# Patient Record
Sex: MALE | Race: WHITE | NOT HISPANIC OR LATINO | Employment: FULL TIME | URBAN - METROPOLITAN AREA
[De-identification: names, ages, dates, MRNs, and addresses within clinical notes are randomized per-mention and may not be internally consistent; named-entity substitution may affect disease eponyms.]

---

## 2021-12-16 ENCOUNTER — NURSE TRIAGE (OUTPATIENT)
Dept: OTHER | Facility: OTHER | Age: 26
End: 2021-12-16

## 2021-12-16 ENCOUNTER — HOSPITAL ENCOUNTER (EMERGENCY)
Facility: HOSPITAL | Age: 26
Discharge: HOME | End: 2021-12-16
Attending: EMERGENCY MEDICINE | Admitting: EMERGENCY MEDICINE
Payer: COMMERCIAL

## 2021-12-16 VITALS
WEIGHT: 250 LBS | RESPIRATION RATE: 20 BRPM | TEMPERATURE: 99 F | SYSTOLIC BLOOD PRESSURE: 130 MMHG | DIASTOLIC BLOOD PRESSURE: 60 MMHG | HEIGHT: 71 IN | BODY MASS INDEX: 35 KG/M2 | OXYGEN SATURATION: 95 % | HEART RATE: 109 BPM

## 2021-12-16 DIAGNOSIS — U07.1 COVID-19 VIRUS INFECTION: Primary | ICD-10-CM

## 2021-12-16 LAB
FLUAV RNA RESP QL NAA+PROBE: NEGATIVE
FLUBV RNA RESP QL NAA+PROBE: NEGATIVE
RSV RNA RESP QL NAA+PROBE: NEGATIVE
SARS-COV-2 RNA RESP QL NAA+PROBE: POSITIVE

## 2021-12-16 PROCEDURE — 99283 EMERGENCY DEPT VISIT LOW MDM: CPT

## 2021-12-16 PROCEDURE — 99284 EMERGENCY DEPT VISIT MOD MDM: CPT | Performed by: EMERGENCY MEDICINE

## 2021-12-16 PROCEDURE — 0241U HB NFCT DS VIR RESP RNA 4 TRGT: CPT | Performed by: EMERGENCY MEDICINE

## 2021-12-17 NOTE — RESULT ENCOUNTER NOTE
PATIENT NOTIFIED OF POSITIVE COVID RESULTS THE NEED QUARANTINE FOR 10 DAYS FROM THE ONSET OF HIS SYMPTOMS    HE WILL FOLLOW UP AS NEEDED

## 2021-12-17 NOTE — ED PROVIDER NOTES
History  Chief Complaint   Patient presents with    Fever - 9 weeks to 74 years     pt states he took at home covid test and tested positive, fever of 104 5 temporal at home, took 4 tylneol at home, c/o slight cough       History provided by:  Patient   used: No    27year-old otherwise healthy male presented for evaluation of high fever today, also noting slight cough  He took for Tylenol tablets prior to arrival   Reports he took a home COVID test and was positive  He is mostly concerned with the high fever  On arrival here temperature has improved  Slightly tachycardic  He denies any difficulty breathing, chest pain or any other significant symptoms  Prior to Admission Medications   Prescriptions Last Dose Informant Patient Reported? Taking?   lisdexamfetamine (VYVANSE) 70 MG capsule Past Month at Unknown time  Yes Yes   Sig: Take 70 mg by mouth every morning      Facility-Administered Medications: None       History reviewed  No pertinent past medical history  History reviewed  No pertinent surgical history  History reviewed  No pertinent family history  I have reviewed and agree with the history as documented  E-Cigarette/Vaping     E-Cigarette/Vaping Substances     Social History     Tobacco Use    Smoking status: Not on file    Smokeless tobacco: Not on file   Substance Use Topics    Alcohol use: Not on file    Drug use: Not on file       Review of Systems   Constitutional: Positive for fever  Negative for activity change, appetite change and chills  Respiratory: Positive for cough  Negative for chest tightness and shortness of breath  Cardiovascular: Negative for chest pain  Gastrointestinal: Negative for abdominal pain, diarrhea, nausea and vomiting  Musculoskeletal: Negative for back pain, neck pain and neck stiffness  Skin: Negative for color change and pallor  Neurological: Negative for dizziness and headaches     All other systems reviewed and are negative  Physical Exam  Physical Exam  Vitals and nursing note reviewed  Constitutional:       Appearance: Normal appearance  HENT:      Head: Normocephalic and atraumatic  Cardiovascular:      Rate and Rhythm: Regular rhythm  Tachycardia present  Pulmonary:      Effort: Pulmonary effort is normal       Breath sounds: Normal breath sounds  Abdominal:      General: There is no distension  Palpations: Abdomen is soft  Musculoskeletal:         General: No swelling  Normal range of motion  Cervical back: Normal range of motion and neck supple  Skin:     General: Skin is warm and dry  Neurological:      General: No focal deficit present  Mental Status: He is alert and oriented to person, place, and time     Psychiatric:         Mood and Affect: Mood normal          Behavior: Behavior normal          Vital Signs  ED Triage Vitals   Temperature Pulse Respirations Blood Pressure SpO2   12/16/21 2247 12/16/21 2248 12/16/21 2247 12/16/21 2247 12/16/21 2247   99 °F (37 2 °C) (!) 109 20 130/60 95 %      Temp Source Heart Rate Source Patient Position - Orthostatic VS BP Location FiO2 (%)   12/16/21 2247 12/16/21 2247 12/16/21 2247 12/16/21 2247 --   Oral Monitor Sitting Left arm       Pain Score       --                  Vitals:    12/16/21 2247 12/16/21 2248   BP: 130/60    Pulse:  (!) 109   Patient Position - Orthostatic VS: Sitting          Visual Acuity      ED Medications  Medications - No data to display    Diagnostic Studies  Results Reviewed     Procedure Component Value Units Date/Time    COVID/FLU/RSV [[id number]]  (Abnormal) Collected: 12/16/21 2252    Lab Status: Final result Specimen: Nares from Nasopharyngeal Swab Updated: 12/16/21 2351     SARS-CoV-2 Positive     INFLUENZA A PCR Negative     INFLUENZA B PCR Negative     RSV PCR Negative    Narrative:      FOR PEDIATRIC PATIENTS - copy/paste COVID Guidelines URL to browser: [email or web address]     This test has been authorized by FDA under an EUA (Emergency Use Assay) for use by authorized laboratories  Clinical caution and judgement should be used with the interpretation of these results with consideration of the clinical impression and other laboratory testing  Testing reported as "Positive" or "Negative" has been proven to be accurate according to standard laboratory validation requirements  All testing is performed with control materials showing appropriate reactivity at standard intervals  No orders to display              Procedures  Procedures         ED Course                                             MDM  Number of Diagnoses or Management Options  COVID-19 virus infection: new and requires workup  Diagnosis management comments: 28-year-old male testing positive at home for COVID after developing high fever today  Fever improved after taking Tylenol  He was counseled on correct dosage of Tylenol and dangers of taking too much  Stable for discharge  Return if worse  Continue supportive care  Amount and/or Complexity of Data Reviewed  Clinical lab tests: ordered    Patient Progress  Patient progress: stable      Disposition  Final diagnoses:   COVID-19 virus infection     Time reflects when diagnosis was documented in both MDM as applicable and the Disposition within this note     Time User Action Codes Description Comment    12/16/2021 11:34 PM Shelli GAMING Add [U07 1] COVID-19 virus infection       ED Disposition     ED Disposition Condition Date/Time Comment    Discharge Stable Thu Dec 16, 2021 11:34 PM Rodrick Child discharge to home/self care              Follow-up Information     Follow up With Specialties Details Why Contact Info Additional [address] Emergency Department Emergency Medicine  If symptoms worsen Janell [address] [facility]  [phone] [facility] [address] Department, [address], [place], [place], [address]          Discharge Medication List as of 12/16/2021 11:34 PM      CONTINUE these medications which have NOT CHANGED    Details   lisdexamfetamine (VYVANSE) 70 MG capsule Take 70 mg by mouth every morning, Historical Med             No discharge procedures on file      PDMP Review     None          ED Provider  Electronically Signed by           Roopa Velázquez MD  01/07/22 2454

## 2022-10-16 PROBLEM — F90.9 ADHD (ATTENTION DEFICIT HYPERACTIVITY DISORDER): Status: ACTIVE | Noted: 2022-10-16

## 2022-10-17 ENCOUNTER — OFFICE VISIT (OUTPATIENT)
Dept: FAMILY MEDICINE CLINIC | Facility: CLINIC | Age: 27
End: 2022-10-17
Payer: COMMERCIAL

## 2022-10-17 VITALS
OXYGEN SATURATION: 97 % | RESPIRATION RATE: 18 BRPM | HEART RATE: 98 BPM | TEMPERATURE: 98.2 F | DIASTOLIC BLOOD PRESSURE: 86 MMHG | HEIGHT: 71 IN | SYSTOLIC BLOOD PRESSURE: 132 MMHG | BODY MASS INDEX: 38.64 KG/M2 | WEIGHT: 276 LBS

## 2022-10-17 DIAGNOSIS — Z13.1 SCREENING FOR DIABETES MELLITUS: ICD-10-CM

## 2022-10-17 DIAGNOSIS — F90.9 ATTENTION DEFICIT HYPERACTIVITY DISORDER (ADHD), UNSPECIFIED ADHD TYPE: ICD-10-CM

## 2022-10-17 DIAGNOSIS — Z00.00 ANNUAL PHYSICAL EXAM: Primary | ICD-10-CM

## 2022-10-17 DIAGNOSIS — Z13.6 SCREENING FOR CARDIOVASCULAR CONDITION: ICD-10-CM

## 2022-10-17 DIAGNOSIS — E66.9 CLASS 2 OBESITY WITH BODY MASS INDEX (BMI) OF 38.0 TO 38.9 IN ADULT, UNSPECIFIED OBESITY TYPE, UNSPECIFIED WHETHER SERIOUS COMORBIDITY PRESENT: ICD-10-CM

## 2022-10-17 DIAGNOSIS — L98.9 SCALP LESION: ICD-10-CM

## 2022-10-17 DIAGNOSIS — E66.01 SEVERE OBESITY (BMI 35.0-39.9) WITH COMORBIDITY (HCC): ICD-10-CM

## 2022-10-17 DIAGNOSIS — Z23 ENCOUNTER FOR IMMUNIZATION: ICD-10-CM

## 2022-10-17 DIAGNOSIS — Z11.59 NEED FOR HEPATITIS C SCREENING TEST: ICD-10-CM

## 2022-10-17 DIAGNOSIS — F81.81 WRITTEN EXPRESSION DISORDER: ICD-10-CM

## 2022-10-17 DIAGNOSIS — E66.9 OBESITY (BMI 35.0-39.9 WITHOUT COMORBIDITY): ICD-10-CM

## 2022-10-17 DIAGNOSIS — Z11.4 SCREENING FOR HIV (HUMAN IMMUNODEFICIENCY VIRUS): ICD-10-CM

## 2022-10-17 DIAGNOSIS — R27.8 DYSGRAPHIA: ICD-10-CM

## 2022-10-17 DIAGNOSIS — G43.109 MIGRAINE WITH AURA AND WITHOUT STATUS MIGRAINOSUS, NOT INTRACTABLE: ICD-10-CM

## 2022-10-17 PROCEDURE — 90651 9VHPV VACCINE 2/3 DOSE IM: CPT

## 2022-10-17 PROCEDURE — 99203 OFFICE O/P NEW LOW 30 MIN: CPT | Performed by: FAMILY MEDICINE

## 2022-10-17 PROCEDURE — 99385 PREV VISIT NEW AGE 18-39: CPT | Performed by: FAMILY MEDICINE

## 2022-10-17 PROCEDURE — 90471 IMMUNIZATION ADMIN: CPT

## 2022-10-17 NOTE — PATIENT INSTRUCTIONS
Please contact your insurance if you are uncertain of coverage for plan of care items  Your insurance may not cover the cost of your Vitamin D blood test, which is approximately $65-70  Please notify the lab prior to blood draw if you would like to decline this test       Refer to the back of insurance card for counseling options  Apps and Websites for Counseling, Anxiety/Depression, Chronic Pain, Lifestyle Change, Problem-solving, Self-Esteem, Anger Management, Coping with Uncertainty    (Prices current as of 9/5/19)    1  Mood Kit - Available in [employer]b Store for $4 99  Supports a person's success in specific situations, includes thought , mood tracker, and journal   Can be accessed in an unstructured way and used as an unguided self-help robb  2   Moodnotes - Available in [employer] Store for $4 99  Focuses on healthier thinking habits and identifying "traps" in thinking  Tracks mood over a period of time and identifies factors that influence mood  3   MoodMission - Available in [facility] for free  Includes five "missions" to promote confidence in handling stressors and coping in specific situations  The robb personalizes its style and techniques based on when the user engages it most frequently  In-robb rewards are used to motivate, increase fun and self-confidence  Helpful for patients who need improvement in mood or a decrease in anxiety and depression symptoms  4    What's Up - Available in [employer] for free  Recognizes negative thinking patterns and methods to overcome them  Uses helpful metaphors, a catastrophe scale, grounding techniques, and breathing exercises  Has the ability to sync data across multiple devices and protect this information with a unique pass code  Has the capability to be active in forums where people can discuss similar feelings and strategies that have been helpful        5   Moodpath - Available in [employer] Store and Google Play for free  Uses daily screenings to create a better understanding of thoughts, feelings, and emotions  When needed, it provides a discussion guide to talking with a medical professional based on the responses to daily screenings  Includes over 150 psychological exercises and videos to promote and strengthen overall mental health  6   MindShift - Available in [employer] for free  Helpful for youth and young adults in coping with anxiety  Creates an individualized toolbox to help users deal with test anxiety, perfectionism, social anxiety, worry, panic, and conflict  Includes directions on how to construct “belief experiments” to trial common beliefs that feed anxiety, guided relaxation, as well as tools and tips to help establish and accomplish goals  Differentiates between helpful and unhelpful anxiety, and explains how to overcome fears by gradually facing them in manageable steps  7   CBT-I  - Available in [employer] for free  For insomnia  Educates about sleep, developing positive sleep routines, and improved sleep environment  Encourages user to change behaviors which will provide confidence for better sleep on a regular basis  8   [place]  [facility] - Free website  Provides self-help and therapy resources that encourages change to combat negative and destructive thought patterns  Includes access to numerous handouts on a variety of symptoms related to anxiety, depression, low self-esteem, panic attacks, social disorders, and more  The solution section has interventions that can be printed and saved for future use  9   Woebot - Available in [employer] for free  Recommended for age 16+    Friendly self-care  that helps you think through situations with step-by-step guidance using counseling tools, learn about yourself with intelligent mood tracking, and master skills to reduce stress and live happier through 100+ evidence-based stories from clinicians  Chat as often or as little as you'd like, whenever you'd like to  10   Luz:  MILAN  CBT DBT Chatbot - Available in Apple appsstore and Google Play for free, has in-karina purchases  Recommended for 12+  Psychologist support at $30/month, 50% off to start  Margokul Lopez / Lennox Inocencio is free  Uses techniques of CBT, DBT, yoga, and meditation to support your needs regarding stress, anxiety, sleep, loss, and a full range of other mental health and wellness issues  11   Curable Pain Relief - Available in Apple Karina store and Google Play for free, has in-karina purchases  Teaches about the chronic pain cycle and how to reverse it, while retraining your brain and nervous system for long-term results  Smart  Linda guides user through updates in pain science to identify, target, eliminate the factors that are keeping user "stuck" in the pain cycle  12   Talkspace Online Therapy - Available in Apple Karina store and Google Play for a fee  Subscription service, starting at $59/week (billed monthly)  All plans include unlimited messaging, and add live video sessions if desired  Unlimited messaging therapy for teens ages 15-14 and special services for couples therapy  You can change therapists or stop subscription renewal at any time  Recommended for 13+  User is matched with a licensed therapist in your state and communicate on your device by text, audio, and video from anywhere, at any time  User will hear back at least once a day, 5 days per week        Counseling services:    • [facility]   82 [facility], [address], [address] [facility], [facility], [address] [facility]  [phone]    • Carmella and [facility] (they have equine therapy too)  8 [facility], [place] [id number],  [facility], [address] [facility]  [address],  [address] [facility], [address]  [phone]     • [address]   70 [facility], [address] [place] [address] [place]   [address]  200 [facility], [address]  [place], [address]    1530 [facility]   [address] [facility], [facility], [address]     [phone]     • REHABILITATION [facility]   [address] [facility], [facility], [address]    • [address]  41 [facility], [address]  [address]   [address] [place] [address]  [email or web address]    • Rj Rico, MSW, LSW  (patients age 11-adult)   [address], 243 [facility], [address]     • Lico Song  [address]  [address] [place] [address], [address]   [facility], [facility] [facility] [address]  [phone]     • [facility], [facility], [place], [place] [facility]  [phone]     • [address] [place] [address] (specializing in addiction)  [facility], [facility] [address]  [phone]    • Dave Lewis  [address] [place] [address], [facility], [facility]      • Maryla Bence, [address] [facility] , [address], [place], [address]  6 , [place], [facility], [address] [facility]  100 [facility], [address], [place], [address] [place]  [phone]     • Foreign Ohs, PsyD  1160 [facility], [place], [facility] [address]   [phone]    • Darien Alicea, [address]  [place] [address] [place] [id number] [phone]      Hotlines:   Please program these numbers into your phone in case you or someone you know needs them  All services are free  65  People who call, text, or chat with 8 will be directly connected to the [address] [place] [address]  The existing Lifeline phone number ([phone]) will remain available  Callers can also connect with the [facility] or assistance in [place]   989 can be used by anyone, any time, at no cost  Trained crisis response professionals can support individuals considering suicide, self-harm, or any behavioral, substance abuse or misuse or mental health need for themselves or people looking for help for a loved one experiencing a mental health crisis  Lifeline services are available 24 hours a day, seven days a week at no cost to the caller  Crisis Text Line: text 084771     You are connected to a Crisis Counselor to bring a hot moment to a cool calm through active listening and collaborative problem solving  WarmLine:  625.279.2302 or [phone]   They provide a supportive listening ear if you need it  They also can also provide information about mental health concerns and services  Crisis Line:  [facility] [phone],  [facility] [phone], [address] [place] [address] and Angie: [phone]   24/7 crisis counseling, on-site counseling and walk-in counseling services available  National Suicide Prevention Lifeline:  3-699-951-626-562-8742  En [address] [facility] 8-393.524.2684   This is free, confidential, and available 24/7  [place]: [phone]   For those facing or having survived abuse 24 hour confidential help line, emergency safe house, counseling, advocacy and education  If you or someone your know are feeling as though you are going to hurt yourself, do not wait - GET HELP RIGHT AWAY  Go to the closest Emergency Room, call 911, or call someone you trust, family member or friend to be with you until you can get some help  For headache and migraine prevention I would suggest a combination vitamin supplement which may include 1 or more of the following ingredients:  Magnesium 400 mg , Riboflavin (vitamin B2) 400 - 600 mg,  Butterbur 150 mg (PA free)  Other ingredients that may be helpful are feverfew, coenzyme Q10 100 mg three times a day,  melatonin and enrico  Some example brands that combine some of these ingredients are Migravent or Dolovent         Wellness Visit for Adults   AMBULATORY CARE: A wellness visit  is when you see your healthcare provider to get screened for health problems  Your healthcare provider will also give you advice on how to stay healthy  Write down your questions so you remember to ask them  Ask your healthcare provider how often you should have a wellness visit  What happens at a wellness visit:  Your healthcare provider will ask about your health, and your family history of health problems  This includes high blood pressure, heart disease, and cancer  He or she will ask if you have symptoms that concern you, if you smoke, and about your mood  You may also be asked about your intake of medicines, supplements, food, and alcohol  Any of the following may be done:  · Your weight  will be checked  Your height may also be checked so your body mass index (BMI) can be calculated  Your BMI shows if you are at a healthy weight  · Your blood pressure  and heart rate will be checked  Your temperature may also be checked  · Blood and urine tests  may be done  Blood tests may be done to check your cholesterol levels  Abnormal cholesterol levels increase your risk for heart disease and stroke  You may also need a blood or urine test to check for diabetes if you are at increased risk  Urine tests may be done to look for signs of an infection or kidney disease  · A physical exam  includes checking your heartbeat and lungs with a stethoscope  Your healthcare provider may also check your skin to look for sun damage  · Screening tests  may be recommended  A screening test is done to check for diseases that may not cause symptoms  The screening tests you may need depend on your age, gender, family history, and lifestyle habits  For example, colorectal screening may be recommended if you are 48years old or older  Screening tests you need if you are a woman:   · A Pap smear  is used to screen for cervical cancer  Pap smears are usually done every 3 to 5 years depending on your age  You may need them more often if you have had abnormal Pap smear test results in the past  Ask your healthcare provider how often you should have a Pap smear  · A mammogram  is an x-ray of your breasts to screen for breast cancer  Experts recommend mammograms every 2 years starting at age 48 years  You may need a mammogram at age 52 years or younger if you have an increased risk for breast cancer  Talk to your healthcare provider about when you should start having mammograms and how often you need them  Vaccines you may need:   · Get an influenza vaccine  every year  The influenza vaccine protects you from the flu  Several types of viruses cause the flu  The viruses change over time, so new vaccines are made each year  · Get a tetanus-diphtheria (Td) booster vaccine  every 10 years  This vaccine protects you against tetanus and diphtheria  Tetanus is a severe infection that may cause painful muscle spasms and lockjaw  Diphtheria is a severe bacterial infection that causes a thick covering in the back of your mouth and throat  · Get a human papillomavirus (HPV) vaccine  if you are female and aged 23 to 32 or male 23 to 24 and never received it  This vaccine protects you from HPV infection  HPV is the most common infection spread by sexual contact  HPV may also cause vaginal, penile, and anal cancers  · Get a pneumococcal vaccine  if you are aged 72 years or older  The pneumococcal vaccine is an injection given to protect you from pneumococcal disease  Pneumococcal disease is an infection caused by pneumococcal bacteria  The infection may cause pneumonia, meningitis, or an ear infection  · Get a shingles vaccine  if you are 60 or older, even if you have had shingles before  The shingles vaccine is an injection to protect you from the varicella-zoster virus  This is the same virus that causes chickenpox   Shingles is a painful rash that develops in people who had chickenpox or have been exposed to the virus     How to eat healthy:  My Plate is a model for planning healthy meals  It shows the types and amounts of foods that should go on your plate  Fruits and vegetables make up about half of your plate, and grains and protein make up the other half  A serving of dairy is included on the side of your plate  The amount of calories and serving sizes you need depends on your age, gender, weight, and height  Examples of healthy foods are listed below:  · Eat a variety of vegetables  such as dark green, red, and orange vegetables  You can also include canned vegetables low in sodium (salt) and frozen vegetables without added butter or sauces  · Eat a variety of fresh fruits , canned fruit in 100% juice, frozen fruit, and dried fruit  · Include whole grains  At least half of the grains you eat should be whole grains  Examples include whole-wheat bread, wheat pasta, brown rice, and whole-grain cereals such as oatmeal     · Eat a variety of protein foods such as seafood (fish and shellfish), lean meat, and poultry without skin (turkey and chicken)  Examples of lean meats include pork leg, shoulder, or tenderloin, and beef round, sirloin, tenderloin, and extra lean ground beef  Other protein foods include eggs and egg substitutes, beans, peas, soy products, nuts, and seeds  · Choose low-fat dairy products such as skim or 1% milk or low-fat yogurt, cheese, and cottage cheese  · Limit unhealthy fats  such as butter, hard margarine, and shortening  Exercise:  Exercise at least 30 minutes per day on most days of the week  Some examples of exercise include walking, biking, dancing, and swimming  You can also fit in more physical activity by taking the stairs instead of the elevator or parking farther away from stores  Include muscle strengthening activities 2 days each week  Regular exercise provides many health benefits   It helps you manage your weight, and decreases your risk for type 2 diabetes, heart disease, stroke, and high blood pressure  Exercise can also help improve your mood  Ask your healthcare provider about the best exercise plan for you  General health and safety guidelines:   · Do not smoke  Nicotine and other chemicals in cigarettes and cigars can cause lung damage  Ask your healthcare provider for information if you currently smoke and need help to quit  E-cigarettes or smokeless tobacco still contain nicotine  Talk to your healthcare provider before you use these products  · Limit alcohol  A drink of alcohol is 12 ounces of beer, 5 ounces of wine, or 1½ ounces of liquor  · Lose weight, if needed  Being overweight increases your risk of certain health conditions  These include heart disease, high blood pressure, type 2 diabetes, and certain types of cancer  · Protect your skin  Do not sunbathe or use tanning beds  Use sunscreen with a SPF 15 or higher  Apply sunscreen at least 15 minutes before you go outside  Reapply sunscreen every 2 hours  Wear protective clothing, hats, and sunglasses when you are outside  · Drive safely  Always wear your seatbelt  Make sure everyone in your car wears a seatbelt  A seatbelt can save your life if you are in an accident  Do not use your cell phone when you are driving  This could distract you and cause an accident  Pull over if you need to make a call or send a text message  · Practice safe sex  Use latex condoms if are sexually active and have more than one partner  Your healthcare provider may recommend screening tests for sexually transmitted infections (STIs)  · Wear helmets, lifejackets, and protective gear  Always wear a helmet when you ride a bike or motorcycle, go skiing, or play sports that could cause a head injury  Wear protective equipment when you play sports  Wear a lifejacket when you are on a boat or doing water sports      © Copyright [employer] 2022 Information is for End User's use only and may not be sold, redistributed or otherwise used for commercial purposes  All illustrations and images included in CareNotes® are the copyrighted property of A D A M , Inc  or Krissy Meredith  The above information is an  only  It is not intended as medical advice for individual conditions or treatments  Talk to your doctor, nurse or pharmacist before following any medical regimen to see if it is safe and effective for you  Obesity   AMBULATORY CARE:   Obesity  means your body mass index (BMI) is greater than 30  Your healthcare provider will use your height and weight to measure your BMI  The risks of obesity include  many health problems, including injuries or physical disability  · Diabetes (high blood sugar level)    · High blood pressure or high cholesterol    · Heart disease    · Stroke    · Gallbladder or liver disease    · Cancer of the colon, breast, prostate, liver, or kidney    · Sleep apnea    · Arthritis or gout    Screening  is done to check for health conditions before you have signs or symptoms  If you are 28to 79years old, your blood sugar level may be checked every 3 years for signs of prediabetes or diabetes  Your healthcare provider will check your blood pressure at each visit  High blood pressure can lead to a stroke or other problems  Your provider may check for signs of heart disease, cancer, or other health problems  Seek care immediately if:   · You have a severe headache, confusion, or difficulty speaking  · You have weakness on one side of your body  · You have chest pain, sweating, or shortness of breath  Call your doctor if:   · You have symptoms of gallbladder or liver disease, such as pain in your upper abdomen  · You have knee or hip pain and discomfort while walking  · You have symptoms of diabetes, such as intense hunger and thirst, and frequent urination  · You have symptoms of sleep apnea, such as snoring or daytime sleepiness      · You have questions or concerns about your condition or care  Treatment for obesity  focuses on helping you lose weight to improve your health  Even a small decrease in BMI can reduce the risk for many health problems  Your healthcare provider will help you set a weight-loss goal   · Lifestyle changes  are the first step in treating obesity  These include making healthy food choices and getting regular physical activity  Your healthcare provider may suggest a weight-loss program that involves coaching, education, and therapy  · Medicine  may help you lose weight when it is used with a healthy foods and physical activity  · Surgery  can help you lose weight if you are very obese and have other health problems  There are several types of weight-loss surgery  Ask your healthcare provider for more information  Tips for safe weight loss:   · Set small, realistic goals  An example of a small goal is to walk for 20 minutes 5 days a week  Anther goal is to lose 5% of your body weight  · Tell friends, family members, and coworkers about your goals  and ask for their support  Ask a friend to lose weight with you, or join a weight-loss support group  · Identify foods or triggers that may cause you to overeat , and find ways to avoid them  Remove tempting high-calorie foods from your home and workplace  Place a bowl of fresh fruit on your kitchen counter  If stress causes you to eat, then find other ways to cope with stress  A counselor or therapist may be able to help you  · Keep a diary to track what you eat and drink  Also write down how many minutes of physical activity you do each day  Weigh yourself once a week and record it in your diary  Eating changes: You will need to eat 500 to 1,000 fewer calories each day than you currently eat to lose 1 to 2 pounds a week  The following changes will help you cut calories:  · Eat smaller portions  Use small plates, no larger than 9 inches in diameter   Fill your plate half full of fruits and vegetables  Measure your food using measuring cups until you know what a serving size looks like  · Eat 3 meals and 1 or 2 snacks each day  Plan your meals in advance  Hdzhemalatha Connors and eat at home most of the time  Eat slowly  Do not skip meals  Skipping meals can lead to overeating later in the day  This can make it harder for you to lose weight  Talk with a dietitian to help you make a meal plan and schedule that is right for you  · Eat fruits and vegetables at every meal   They are low in calories and high in fiber, which makes you feel full  Do not add butter, margarine, or cream sauce to vegetables  Use herbs to season steamed vegetables  · Eat less fat and fewer fried foods  Eat more baked or grilled chicken and fish  These protein sources are lower in calories and fat than red meat  Limit fast food  Dress your salads with olive oil and vinegar instead of bottled dressing  · Limit the amount of sugar you eat  Do not drink sugary beverages  Limit alcohol  Activity changes:  Physical activity is good for your body in many ways  It helps you burn calories and build strong muscles  It decreases stress and depression, and improves your mood  It can also help you sleep better  Talk to your healthcare provider before you begin an exercise program   · Exercise for at least 30 minutes 5 days a week  Start slowly  Set aside time each day for physical activity that you enjoy and that is convenient for you  It is best to do both weight training and an activity that increases your heart rate, such as walking, bicycling, or swimming  · Find ways to be more active  Do yard work and housecleaning  Walk up the stairs instead of using elevators  Spend your leisure time going to events that require walking, such as outdoor festivals or fairs  This extra physical activity can help you lose weight and keep it off  Follow up with your doctor as directed:   You may need to meet with a dietitian  Write down your questions so you remember to ask them during your visits  © Copyright [employer] 2022 Information is for End User's use only and may not be sold, redistributed or otherwise used for commercial purposes  All illustrations and images included in CareNotes® are the copyrighted property of A [employer] A [employer]  or Krissy Meredith  The above information is an  only  It is not intended as medical advice for individual conditions or treatments  Talk to your doctor, nurse or pharmacist before following any medical regimen to see if it is safe and effective for you  Cholesterol and Your Health   AMBULATORY CARE:   Cholesterol  is a waxy, fat-like substance  Your body uses cholesterol to make hormones and new cells, and to protect nerves  Cholesterol is made by your body  It also comes from certain foods you eat, such as meat and dairy products  Your healthcare provider can help you set goals for your cholesterol levels  He or she can help you create a plan to meet your goals  Cholesterol level goals: Your cholesterol level goals depend on your risk for heart disease, your age, and your other health conditions  The following are general guidelines:  · Total cholesterol  includes low-density lipoprotein (LDL), high-density lipoprotein (HDL), and triglyceride levels  The total cholesterol level should be lower than 200 mg/dL and is best at about 150 mg/dL  · LDL cholesterol  is called bad cholesterol  because it forms plaque in your arteries  As plaque builds up, your arteries become narrow, and less blood flows through  When plaque decreases blood flow to your heart, you may have chest pain  If plaque completely blocks an artery that brings blood to your heart, you may have a heart attack  Plaque can break off and form blood clots  Blood clots may block arteries in your brain and cause a stroke  The level should be less than 130 mg/dL and is best at about 100 mg/dL  · HDL cholesterol  is called good cholesterol  because it helps remove LDL cholesterol from your arteries  It does this by attaching to LDL cholesterol and carrying it to your liver  Your liver breaks down LDL cholesterol so your body can get rid of it  High levels of HDL cholesterol can help prevent a heart attack and stroke  Low levels of HDL cholesterol can increase your risk for heart disease, heart attack, and stroke  The level should be 60 mg/dL or higher  · Triglycerides  are a type of fat that store energy from foods you eat  High levels of triglycerides also cause plaque buildup  This can increase your risk for a heart attack or stroke  If your triglyceride level is high, your LDL cholesterol level may also be high  The level should be less than 150 mg/dL  Any of the following can increase your risk for high cholesterol:   · Smoking cigarettes    · Being overweight or obese, or not getting enough exercise    · Drinking large amounts of alcohol    · A medical condition such as hypertension (high blood pressure) or diabetes    · Certain genes passed from your parents to you    · Age older than 65 years    What you need to know about having your cholesterol levels checked: Adults 21to 39years of age should have their cholesterol levels checked every 4 to 6 years  Adults 45 years or older should have their cholesterol checked every 1 to 2 years  You may need your cholesterol checked more often, or at a younger age, if you have risk factors for heart disease  You may also need to have your cholesterol checked more often if you have other health conditions, such as diabetes  Blood tests are used to check cholesterol levels  Blood tests measure your levels of triglycerides, LDL cholesterol, and HDL cholesterol  How healthy fats affect your cholesterol levels:  Healthy fats, also called unsaturated fats, help lower LDL cholesterol and triglyceride levels   Healthy fats include the following:  · Monounsaturated fats  are found in foods such as olive oil, canola oil, avocado, nuts, and olives  · Polyunsaturated fats,  such as omega 3 fats, are found in fish, such as salmon, trout, and tuna  They can also be found in plant foods such as flaxseed, walnuts, and soybeans  How unhealthy fats affect your cholesterol levels:  Unhealthy fats increase LDL cholesterol and triglyceride levels  They are found in foods high in cholesterol, saturated fat, and trans fat:  · Cholesterol  is found in eggs, dairy, and meat  · Saturated fat  is found in butter, cheese, ice cream, whole milk, and coconut oil  Saturated fat is also found in meat, such as sausage, hot dogs, and bologna  · Trans fat  is found in liquid oils and is used in fried and baked foods  Foods that contain trans fats include chips, crackers, muffins, sweet rolls, microwave popcorn, and cookies  Treatment  for high cholesterol will also decrease your risk of heart disease, heart attack, and stroke  Treatment may include any of the following:  · Lifestyle changes  may include food, exercise, weight loss, and quitting smoking  You may also need to decrease the amount of alcohol you drink  Your healthcare provider will want you to start with lifestyle changes  Other treatment may be added if lifestyle changes are not enough  Your healthcare provider may recommend you work with a team to manage hyperlipidemia  The team may include medical experts such as a dietitian, an exercise or physical therapist, and a behavior therapist  Your family members may be included in helping you create lifestyle changes  · Medicines  may be given to lower your LDL cholesterol, triglyceride levels, or total cholesterol level  You may need medicines to lower your cholesterol if any of the following is true:    ? You have a history of stroke, TIA, unstable angina, or a heart attack  ? Your LDL cholesterol level is 190 mg/dL or higher      ? You are age 36 to 76 years, have diabetes or heart disease risk factors, and your LDL cholesterol is 70 mg/dL or higher  · Supplements  include fish oil, red yeast rice, and garlic  Fish oil may help lower your triglyceride and LDL cholesterol levels  It may also increase your HDL cholesterol level  Red yeast rice may help decrease your total cholesterol level and LDL cholesterol level  Garlic may help lower your total cholesterol level  Do not take any supplements without talking to your healthcare provider  Food changes you can make to lower your cholesterol levels:  A dietitian can help you create a healthy eating plan  He or she can show you how to read food labels and choose foods low in saturated fat, trans fats, and cholesterol  · Decrease the total amount of fat you eat  Choose lean meats, fat-free or 1% fat milk, and low-fat dairy products, such as yogurt and cheese  Try to limit or avoid red meats  Limit or do not eat fried foods or baked goods, such as cookies  · Replace unhealthy fats with healthy fats  Cook foods in olive oil or canola oil  Choose soft margarines that are low in saturated fat and trans fat  Seeds, nuts, and avocados are other examples of healthy fats  · Eat foods with omega-3 fats  Examples include salmon, tuna, mackerel, walnuts, and flaxseed  Eat fish 2 times per week  Pregnant women should not eat fish that have high levels of mercury, such as shark, swordfish, and octavia mackerel  · Increase the amount of high-fiber foods you eat  High-fiber foods can help lower your LDL cholesterol  Aim to get between 20 and 30 grams of fiber each day  Fruits and vegetables are high in fiber  Eat at least 5 servings each day  Other high-fiber foods are whole-grain or whole-wheat breads, pastas, or cereals, and brown rice  Eat 3 ounces of whole-grain foods each day  Increase fiber slowly   You may have abdominal discomfort, bloating, and gas if you add fiber to your diet too quickly  · Eat healthy protein foods  Examples include low-fat dairy products, skinless chicken and turkey, fish, and nuts  · Limit foods and drinks that are high in sugar  Your dietitian or healthcare provider can help you create daily limits for high-sugar foods and drinks  The limit may be lower if you have diabetes or another health condition  Limits can also help you lose weight if needed  Lifestyle changes you can make to lower your cholesterol levels:   · Maintain a healthy weight  Ask your healthcare provider what a healthy weight is for you  Ask him or her to help you create a weight loss plan if needed  Weight loss can decrease your total cholesterol and triglyceride levels  Weight loss may also help keep your blood pressure at a healthy level  · Be physically active throughout the day  Physical activity, such as exercise, can help lower your total cholesterol level and maintain a healthy weight  Physical activity can also help increase your HDL cholesterol level  Work with your healthcare provider to create an program that is right for you  Get at least 30 to 40 minutes of moderate physical activity most days of the week  Examples of exercise include brisk walking, swimming, or biking  Also include strength training at least 2 times each week  Your healthcare providers can help you create a physical activity plan  · Do not smoke  Nicotine and other chemicals in cigarettes and cigars can raise your cholesterol levels  Ask your healthcare provider for information if you currently smoke and need help to quit  E-cigarettes or smokeless tobacco still contain nicotine  Talk to your healthcare provider before you use these products  · Limit or do not drink alcohol  Alcohol can increase your triglyceride levels  Ask your healthcare provider before you drink alcohol  Ask how much is okay for you to drink in 24 hours or 1 week      Follow up with your doctor as directed: Write down your questions so you remember to ask them during your visits  © Copyright [employer] 2022 Information is for End User's use only and may not be sold, redistributed or otherwise used for commercial purposes  All illustrations and images included in CareNotes® are the copyrighted property of [employer] M [employer]  or Krissy Meredith  The above information is an  only  It is not intended as medical advice for individual conditions or treatments  Talk to your doctor, nurse or pharmacist before following any medical regimen to see if it is safe and effective for you
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
## 2022-10-18 NOTE — PROGRESS NOTES
Assessment/Plan:  Problem List Items Addressed This Visit        Cardiovascular and Mediastinum    Migraine with aura and without status migrainosus, not intractable     Uncontrolled  To consider Home Sleep Study to R/O Sleep Apnea? For headache and migraine prevention I would suggest a combination vitamin supplement which may include 1 or more of the following ingredients:  Magnesium 400 mg , Riboflavin (vitamin B2) 400 - 600 mg,  Butterbur 150 mg (PA free)  Other ingredients that may be helpful are feverfew, coenzyme Q10 100 mg three times a day,  melatonin and enrico  Some example brands that combine some of these ingredients are Migravent or Dolovent  Relevant Orders    Magnesium       Other    ADHD (attention deficit hyperactivity disorder)     Uncontrolled  Pending labs  Will need CSA and RUDS in future  To consider restart Vyvanse 30mg QD vs  Adderall XR 20mg QD vs  Alternate stimulant? Obesity     Pending labs  Recommend lifestyle modifications  To consider Home Sleep Study to R/O Sleep Apnea? Dysgraphia     Stable  Written expression disorder     Stable               Other Visit Diagnoses     Annual physical exam    -  Primary    Screening for diabetes mellitus        Relevant Orders    Hemoglobin A1C    Scalp lesion        Relevant Orders    Ambulatory Referral to Plastic Surgery    Screening for cardiovascular condition        Relevant Orders    CBC and differential    Comprehensive metabolic panel    Lipid panel    LDL cholesterol, direct    Screening for HIV (human immunodeficiency virus)        Relevant Orders    HIV 1/2 Antigen/Antibody (4th Generation) w Reflex [facility]    Need for hepatitis C screening test        Relevant Orders    Hepatitis C antibody    Encounter for immunization        Relevant Orders    HPV VACCINE 9 VALENT IM (Completed)    Obesity (BMI 35 0-39 9 without comorbidity)        Relevant Orders    Comprehensive metabolic panel    TSH, 3rd generation with Free T4 reflex    Severe obesity (BMI 35 0-39  9) with comorbidity ([address] )        Relevant Orders    Comprehensive metabolic panel    TSH, 3rd generation with Free T4 reflex    Hemoglobin A1C    Vitamin D 25 hydroxy    BMI 38 0-38 9,adult        Relevant Orders    Comprehensive metabolic panel    TSH, 3rd generation with Free T4 reflex    Hemoglobin A1C    Vitamin D 25 hydroxy           Return in about 6 weeks (around 11/28/2022) for 40min - F/U ADHD, Migraine, Labs  Future Appointments   Date Time Provider Oscar Morgan   11/30/2022  1:00 PM Aure Araujo DO [facility]        Subjective:     Robin Thompson is a 27 y o  male who presents today for a follow-up on his chronic medical conditions  HPI:  Chief Complaint   Patient presents with   • Weight Gain     Pt states he gained 50lbs in one month after increasing Vyvanse to 70mg  Pt stopped taking the medication the beginning of the year  Pt has not been able to lose the weight since  Concerned about weight gain and circulation  • ADHD     ADHD sx are uncontrolled by medications at this time  Pt has not taken any meds since the beginning of the year  Does not follow with psych  • HM     Pt declines covid and flu vaccines  No prior HPV, HIV, or Hep C screens done that pt is aware of     • Migraine     Intermittent migraine headaches with light sensitivity, no sound sensitivity  Located behind the eyes  No nausea/vomiting with headaches  Pt states this has been going on for many years, but have recently gotten more frequent  -- Above per clinical staff and reviewed  --      HPI      Today:      Physical    Watching diet  No exercise  Sees Dentist q6 months  Overdue for Optho          Health Maintenance   Topic Date Due   • Hepatitis C Screening  Never done   • HIV Screening  Never done   • COVID-19 Vaccine (1) 01/17/2023 (Originally 5/18/1996)   • Influenza Vaccine (1) 06/30/2023 (Originally 9/1/2022)   • Depression Screening  10/17/2023   • BMI: Followup Plan  10/17/2023   • BMI: Adult  10/17/2023   • Annual Physical  10/17/2023   • DTaP,Tdap,and Td Vaccines (5 - Td or Tdap) 08/11/2026   • HIB Vaccine  Completed   • Hepatitis B Vaccine  Completed   • IPV Vaccine  Completed   • Meningococcal ACWY Vaccine  Completed   • HPV Vaccine  Completed   • Pneumococcal Vaccine: Pediatrics (0 to 5 Years) and At-Risk Patients (6 to 59 Years)  Aged Out   • Hepatitis A Vaccine  Aged Out         The following portions of the patient's history were reviewed and updated as appropriate: allergies, current medications, past family history, past medical history, past social history, past surgical history and problem list       Review of Systems     See other note  No current outpatient medications on file  No current facility-administered medications for this visit  Objective:  /86   Pulse 98   Temp 98 2 °F (36 8 °C)   Resp 18   Ht 5' 11" (1 803 m)   Wt 125 kg (276 lb)   SpO2 97%   BMI 38 49 kg/m²    Wt Readings from Last 3 Encounters:   10/17/22 125 kg (276 lb)   12/16/21 113 kg (250 lb)      BP Readings from Last 3 Encounters:   10/17/22 132/86   12/16/21 130/60          Physical Exam     Vitals and nursing note reviewed  Constitutional:       Appearance: Normal appearance  He is well-developed  He is obese  HENT:      Head: Normocephalic and atraumatic  Right Ear: Tympanic membrane, ear canal and external ear normal       Left Ear: Tympanic membrane, ear canal and external ear normal       Nose: Nose normal       Right Sinus: No maxillary sinus tenderness or frontal sinus tenderness  Left Sinus: No maxillary sinus tenderness or frontal sinus tenderness  Mouth/Throat:      Mouth: Mucous membranes are moist       Pharynx: Oropharynx is clear  Uvula midline  Tonsils: No tonsillar exudate  Eyes:      Extraocular Movements: Extraocular movements intact        Conjunctiva/sclera: Conjunctivae normal       Pupils: Pupils are equal, round, and reactive to light  Cardiovascular:      Rate and Rhythm: Normal rate and regular rhythm  Pulses: Normal pulses  Heart sounds: Normal heart sounds  Pulmonary:      Effort: Pulmonary effort is normal       Breath sounds: Normal breath sounds  Abdominal:      General: Bowel sounds are normal  There is no distension  Palpations: Abdomen is soft  There is no mass  Tenderness: There is no abdominal tenderness  There is no guarding or rebound  Musculoskeletal:         General: No swelling or tenderness  Cervical back: Neck supple  Right lower leg: No edema  Left lower leg: No edema  Lymphadenopathy:      Cervical: No cervical adenopathy  Skin:     Findings: No rash  Comments: Left posterior scalp c 1cm x 1 cm pink papule  No rubor, calor, dolor  Neurological:      General: No focal deficit present  Mental Status: He is alert and oriented to person, place, and time  Cranial Nerves: No cranial nerve deficit  Sensory: No sensory deficit  Motor: No weakness  Coordination: Coordination normal       Deep Tendon Reflexes: Reflexes normal    Psychiatric:         Mood and Affect: Mood normal          Behavior: Behavior normal          Thought Content: Thought content normal          Judgment: Judgment normal      Lab Results:      No results found for: WBC, HGB, HCT, PLT, CHOL, TRIG, HDL, LDLDIRECT, ALT, AST, NA, K, CL, CREATININE, BUN, CO2, TSH, PSA, INR, GLUF, HGBA1C, MICROALBUR  No results found for: URICACID  Invalid input(s): BASENAME Vitamin D    No results found       POCT Labs

## 2022-10-18 NOTE — ASSESSMENT & PLAN NOTE
Uncontrolled  Pending labs  Will need CSA and RUDS in future  To consider restart Vyvanse 30mg QD vs  Adderall XR 20mg QD vs  Alternate stimulant?

## 2022-10-18 NOTE — ASSESSMENT & PLAN NOTE
Uncontrolled  To consider Home Sleep Study to R/O Sleep Apnea? For headache and migraine prevention I would suggest a combination vitamin supplement which may include 1 or more of the following ingredients:  Magnesium 400 mg , Riboflavin (vitamin B2) 400 - 600 mg,  Butterbur 150 mg (PA free)  Other ingredients that may be helpful are feverfew, coenzyme Q10 100 mg three times a day,  melatonin and enrico  Some example brands that combine some of these ingredients are Migravent or Dolovent

## 2022-11-04 PROBLEM — E78.5 HYPERLIPIDEMIA: Status: ACTIVE | Noted: 2022-11-01

## 2022-11-04 PROBLEM — E55.9 VITAMIN D DEFICIENCY: Status: ACTIVE | Noted: 2022-11-01

## 2022-11-04 LAB
25(OH)D3+25(OH)D2 SERPL-MCNC: 13.3 NG/ML (ref 30–100)
ALBUMIN SERPL-MCNC: 4.5 G/DL (ref 4.1–5.2)
ALBUMIN/GLOB SERPL: 1.5 {RATIO} (ref 1.2–2.2)
ALP SERPL-CCNC: 103 IU/L (ref 44–121)
ALT SERPL-CCNC: 87 IU/L (ref 0–44)
AST SERPL-CCNC: 48 IU/L (ref 0–40)
BASOPHILS # BLD AUTO: 0 X10E3/UL (ref 0–0.2)
BASOPHILS NFR BLD AUTO: 1 %
BILIRUB SERPL-MCNC: 0.6 MG/DL (ref 0–1.2)
BUN SERPL-MCNC: 13 MG/DL (ref 6–20)
BUN/CREAT SERPL: 13 (ref 9–20)
CALCIUM SERPL-MCNC: 9.5 MG/DL (ref 8.7–10.2)
CHLORIDE SERPL-SCNC: 101 MMOL/L (ref 96–106)
CHOLEST SERPL-MCNC: 248 MG/DL (ref 100–199)
CO2 SERPL-SCNC: 22 MMOL/L (ref 20–29)
CREAT SERPL-MCNC: 1.04 MG/DL (ref 0.76–1.27)
EGFR: 103 ML/MIN/1.73
EOSINOPHIL # BLD AUTO: 0.1 X10E3/UL (ref 0–0.4)
EOSINOPHIL NFR BLD AUTO: 2 %
ERYTHROCYTE [DISTWIDTH] IN BLOOD BY AUTOMATED COUNT: 13 % (ref 11.6–15.4)
GLOBULIN SER-MCNC: 3 G/DL (ref 1.5–4.5)
GLUCOSE SERPL-MCNC: 94 MG/DL (ref 70–99)
HBA1C MFR BLD: 5.2 % (ref 4.8–5.6)
HCT VFR BLD AUTO: 49.9 % (ref 37.5–51)
HCV AB S/CO SERPL IA: <0.1 S/CO RATIO (ref 0–0.9)
HDLC SERPL-MCNC: 37 MG/DL
HGB BLD-MCNC: 16.8 G/DL (ref 13–17.7)
HIV 1+2 AB+HIV1 P24 AG SERPL QL IA: NON REACTIVE
IMM GRANULOCYTES # BLD: 0 X10E3/UL (ref 0–0.1)
IMM GRANULOCYTES NFR BLD: 0 %
LDLC SERPL CALC-MCNC: 149 MG/DL (ref 0–99)
LDLC SERPL DIRECT ASSAY-MCNC: 144 MG/DL (ref 0–99)
LYMPHOCYTES # BLD AUTO: 1.9 X10E3/UL (ref 0.7–3.1)
LYMPHOCYTES NFR BLD AUTO: 32 %
MAGNESIUM SERPL-MCNC: 2.2 MG/DL (ref 1.6–2.3)
MCH RBC QN AUTO: 28 PG (ref 26.6–33)
MCHC RBC AUTO-ENTMCNC: 33.7 G/DL (ref 31.5–35.7)
MCV RBC AUTO: 83 FL (ref 79–97)
MONOCYTES # BLD AUTO: 0.5 X10E3/UL (ref 0.1–0.9)
MONOCYTES NFR BLD AUTO: 9 %
NEUTROPHILS # BLD AUTO: 3.4 X10E3/UL (ref 1.4–7)
NEUTROPHILS NFR BLD AUTO: 56 %
PLATELET # BLD AUTO: 284 X10E3/UL (ref 150–450)
POTASSIUM SERPL-SCNC: 4.3 MMOL/L (ref 3.5–5.2)
PROT SERPL-MCNC: 7.5 G/DL (ref 6–8.5)
RBC # BLD AUTO: 5.99 X10E6/UL (ref 4.14–5.8)
SL AMB VLDL CHOLESTEROL CALC: 62 MG/DL (ref 5–40)
SODIUM SERPL-SCNC: 139 MMOL/L (ref 134–144)
TRIGL SERPL-MCNC: 333 MG/DL (ref 0–149)
TSH SERPL DL<=0.005 MIU/L-ACNC: 2.03 UIU/ML (ref 0.45–4.5)
WBC # BLD AUTO: 6.1 X10E3/UL (ref 3.4–10.8)

## 2022-11-04 NOTE — RESULT ENCOUNTER NOTE
Unstable labs - will review with patient at upcoming appointment  Hyperlipidemia - Recommend lifestyle modifications  Elevated Liver Enzymes - Mild  Recheck CMP in 1 month  Vitamin D Deficiency - Recommend start multivitamin and prescription vitamin D (Drisdol)  When 12 weeks of Drisdol completed, continue multivitamin and start over-the-counter Vitamin D3 1,000-3,000 International Units daily  Check vitamin D level 1 week afterwards completing Drisdol

## 2022-11-09 ENCOUNTER — TELEPHONE (OUTPATIENT)
Dept: PLASTIC SURGERY | Facility: CLINIC | Age: 27
End: 2022-11-09

## 2022-11-09 NOTE — TELEPHONE ENCOUNTER
Spoke w/ pt regarding referral, pt does not want to schedule an appointment at this time and requested to be removed from the list  Referral closed

## 2022-11-30 ENCOUNTER — OFFICE VISIT (OUTPATIENT)
Dept: FAMILY MEDICINE CLINIC | Facility: CLINIC | Age: 27
End: 2022-11-30

## 2022-11-30 VITALS
HEART RATE: 118 BPM | DIASTOLIC BLOOD PRESSURE: 82 MMHG | WEIGHT: 281 LBS | SYSTOLIC BLOOD PRESSURE: 114 MMHG | OXYGEN SATURATION: 95 % | HEIGHT: 71 IN | BODY MASS INDEX: 39.34 KG/M2 | TEMPERATURE: 97.7 F

## 2022-11-30 DIAGNOSIS — R06.83 SNORING: ICD-10-CM

## 2022-11-30 DIAGNOSIS — E78.5 HYPERLIPIDEMIA, UNSPECIFIED HYPERLIPIDEMIA TYPE: Primary | ICD-10-CM

## 2022-11-30 DIAGNOSIS — R74.8 ELEVATED LIVER ENZYMES: ICD-10-CM

## 2022-11-30 DIAGNOSIS — F90.9 ATTENTION DEFICIT HYPERACTIVITY DISORDER (ADHD), UNSPECIFIED ADHD TYPE: ICD-10-CM

## 2022-11-30 DIAGNOSIS — E66.9 CLASS 2 OBESITY WITH BODY MASS INDEX (BMI) OF 39.0 TO 39.9 IN ADULT, UNSPECIFIED OBESITY TYPE, UNSPECIFIED WHETHER SERIOUS COMORBIDITY PRESENT: ICD-10-CM

## 2022-11-30 DIAGNOSIS — G43.109 MIGRAINE WITH AURA AND WITHOUT STATUS MIGRAINOSUS, NOT INTRACTABLE: ICD-10-CM

## 2022-11-30 DIAGNOSIS — Z13.6 SCREENING FOR CARDIOVASCULAR CONDITION: ICD-10-CM

## 2022-11-30 DIAGNOSIS — E55.9 VITAMIN D DEFICIENCY: ICD-10-CM

## 2022-11-30 RX ORDER — ERGOCALCIFEROL (VITAMIN D2) 1250 MCG
50000 CAPSULE ORAL WEEKLY
Qty: 12 CAPSULE | Refills: 0 | Status: SHIPPED | OUTPATIENT
Start: 2022-11-30 | End: 2023-02-16
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
## 2022-12-01 NOTE — ASSESSMENT & PLAN NOTE
Stable  Pending Home Sleep Study to R/O Sleep Apnea? For headache and migraine prevention I would suggest a combination vitamin supplement which may include 1 or more of the following ingredients:  Magnesium 400 mg , Riboflavin (vitamin B2) 400 - 600 mg,  Butterbur 150 mg (PA free)  Other ingredients that may be helpful are feverfew, coenzyme Q10 100 mg three times a day,  melatonin and enrico  Some example brands that combine some of these ingredients are Migravent or Dolovent

## 2022-12-01 NOTE — ASSESSMENT & PLAN NOTE
Stable on energy focus drink  Patient declines starting ADHD meds  In future if he reconsiders, he will need CSA and RUDS  To consider restart Vyvanse 30mg QD vs  Adderall XR 20mg QD vs  Alternate stimulant PRN?

## 2023-02-02 ENCOUNTER — HOSPITAL ENCOUNTER (OUTPATIENT)
Dept: SLEEP CENTER | Facility: HOSPITAL | Age: 28
Discharge: HOME | End: 2023-02-02

## 2023-02-02 DIAGNOSIS — F90.9 ATTENTION DEFICIT HYPERACTIVITY DISORDER (ADHD), UNSPECIFIED ADHD TYPE: ICD-10-CM

## 2023-02-02 DIAGNOSIS — G43.109 MIGRAINE WITH AURA AND WITHOUT STATUS MIGRAINOSUS, NOT INTRACTABLE: ICD-10-CM

## 2023-02-02 DIAGNOSIS — E66.9 CLASS 2 OBESITY WITH BODY MASS INDEX (BMI) OF 39.0 TO 39.9 IN ADULT, UNSPECIFIED OBESITY TYPE, UNSPECIFIED WHETHER SERIOUS COMORBIDITY PRESENT: ICD-10-CM

## 2023-02-02 DIAGNOSIS — R06.83 SNORING: ICD-10-CM

## 2023-02-06 NOTE — RESULT ENCOUNTER NOTE
Home sleep study is positive for severe obstructive sleep apnea and severe decreased oxygenation at night  Decreased breathing due to obesity may be contributing  Nurse to give patient contact information to reschedule sleep medicine follow-up appointment      Message sent to patient via [email or web address] patient portal

## 2023-02-06 NOTE — PROGRESS NOTES
Home Sleep Study Documentation    HOME STUDY DEVICE: Noxturnal no                                           Suze G3 yes      Pre-Sleep Home Study:    Set-up and instructions performed by: Luz Garzon performed demonstration for Patient: yes    Return demonstration performed by Patient: yes    Written instructions provided to Patient: yes    Patient signed consent form: yes        Post-Sleep Home Study:    Additional comments by Patient: none    Home Sleep Study Failed:no:    Failure reason: N/A    Reported or Detected: N/A    Scored by:  RODDY Fernandez

## 2023-02-09 ENCOUNTER — TELEPHONE (OUTPATIENT)
Dept: SLEEP CENTER | Facility: CLINIC | Age: 28
End: 2023-02-09

## 2023-02-09 NOTE — TELEPHONE ENCOUNTER
PCP reached out to the patient with results of sleep study     Sleep study shows severe MORRIS   AHI 51 0 Patient needs to schedule consult with Dr Leelee Cleveland

## 2023-02-28 ENCOUNTER — OFFICE VISIT (OUTPATIENT)
Dept: SLEEP CENTER | Facility: CLINIC | Age: 28
End: 2023-02-28

## 2023-02-28 VITALS
DIASTOLIC BLOOD PRESSURE: 74 MMHG | HEIGHT: 71 IN | BODY MASS INDEX: 40.32 KG/M2 | HEART RATE: 113 BPM | SYSTOLIC BLOOD PRESSURE: 134 MMHG | WEIGHT: 288 LBS

## 2023-02-28 DIAGNOSIS — G47.34 NOCTURNAL HYPOXIA: ICD-10-CM

## 2023-02-28 DIAGNOSIS — F90.9 ATTENTION DEFICIT HYPERACTIVITY DISORDER (ADHD), UNSPECIFIED ADHD TYPE: ICD-10-CM

## 2023-02-28 DIAGNOSIS — G47.33 OSA (OBSTRUCTIVE SLEEP APNEA): Primary | ICD-10-CM

## 2023-02-28 DIAGNOSIS — E66.01 MORBID OBESITY (HCC): ICD-10-CM

## 2023-02-28 DIAGNOSIS — G47.19 EXCESSIVE DAYTIME SLEEPINESS: ICD-10-CM

## 2023-02-28 DIAGNOSIS — J35.1 TONSILLAR HYPERTROPHY: ICD-10-CM

## 2023-02-28 DIAGNOSIS — G47.9 SLEEP DISTURBANCE: ICD-10-CM

## 2023-02-28 DIAGNOSIS — R00.0 TACHYCARDIA: ICD-10-CM

## 2023-02-28 NOTE — PATIENT INSTRUCTIONS
What is MORRIS? Obstructive sleep apnea is a common and serious sleep disorder that causes you to stop breathing during sleep  The airway repeatedly becomes blocked, limiting the amount of air that reaches your lungs  When this happens, you may snore loudly or making choking noises as you try to breathe  Your brain and body becomes oxygen deprived and you may wake up  This may happen a few times a night, or in more severe cases, several hundred times a night  Sleep apnea can make you wake up in the morning feeling tired or unrefreshed even though you have had a full night of sleep  During the day, you may feel fatigued, have difficulty concentrating or you may even unintentionally fall asleep  This is because your body is waking up numerous times throughout the night, even though you might not be conscious of each awakening  The lack of oxygen your body receives can have negative long-term consequences for your health  This includes:  High blood pressure  Heart disease  Irregular heart rhythms  Stroke  Pre-diabetes and diabetes  Depression  Testing  An objective evaluation of your sleep may be needed before your board certified sleep physician can make a diagnosis  Options include:   In-lab overnight sleep study  This type of sleep study requires you to stay overnight at a sleep center, in a bed that may resemble a hotel room  You will sleep with sensors hooked up to various parts of your body  These sensors record your brain waves, heartbeat, breathing and movement  An overnight sleep study also provides your doctor with the most complete information about your sleep  Learn more about an overnight sleep study  Home sleep apnea test  Some patients with high risk factors for obstructive sleep apnea and no other medical disorders may be candidates for a home sleep apnea test  The testing equipment differs in that it is less complicated than what is used in an overnight sleep study   As such, does not give all the data an in-lab will and if negative, may not mean you do not have the problem  Treatment for sleep apnea includes using a continuous positive airway pressure (CPAP) machine to keep your airway open during sleep  A mask is placed over your nose and mouth, or just your nose  The mask is hooked to the CPAP machine that blows a gentle stream of air into the mask when you breathe  This helps keep your airway open so you can breathe more regularly  Extra oxygen may be given to you through the machine  You may be given a mouth device  It looks like a mouth guard or dental retainer and stops your tongue and mouth tissues from blocking your throat while you sleep  Surgery may be needed to remove extra tissues that block your mouth, throat, or nose  Manage sleep apnea:   Do not smoke  Nicotine and other chemicals in cigarettes and cigars can cause lung damage  Ask your healthcare provider for information if you currently smoke and need help to quit  E-cigarettes or smokeless tobacco still contain nicotine  Talk to your healthcare provider before you use these products  Do not drink alcohol or take sedative medicine before you go to sleep  Alcohol and sedatives can relax the muscles and tissues around your throat  This can block the airflow to your lungs  Maintain a healthy weight  Excess tissue around your throat may restrict your breathing  Ask your healthcare provider for information if you need to lose weight  Sleep on your side or use pillows designed to prevent sleep apnea  This prevents your tongue or other tissues from blocking your throat  You can also raise the head of your bed  Driving Safety  Refrain from driving when drowsy  Follow up with your healthcare provider as directed: Write down your questions so you remember to ask them during your visits  Go to AASM website for more information: Sleepeducation  org  What is MORRIS?    Obstructive sleep apnea is a common and serious sleep disorder that causes you to stop breathing during sleep  The airway repeatedly becomes blocked, limiting the amount of air that reaches your lungs  When this happens, you may snore loudly or making choking noises as you try to breathe  Your brain and body becomes oxygen deprived and you may wake up  This may happen a few times a night, or in more severe cases, several hundred times a night  Sleep apnea can make you wake up in the morning feeling tired or unrefreshed even though you have had a full night of sleep  During the day, you may feel fatigued, have difficulty concentrating or you may even unintentionally fall asleep  This is because your body is waking up numerous times throughout the night, even though you might not be conscious of each awakening  The lack of oxygen your body receives can have negative long-term consequences for your health  This includes:  High blood pressure  Heart disease  Irregular heart rhythms  Stroke  Pre-diabetes and diabetes  Depression  Testing  An objective evaluation of your sleep may be needed before your board certified sleep physician can make a diagnosis  Options include:   In-lab overnight sleep study  This type of sleep study requires you to stay overnight at a sleep center, in a bed that may resemble a hotel room  You will sleep with sensors hooked up to various parts of your body  These sensors record your brain waves, heartbeat, breathing and movement  An overnight sleep study also provides your doctor with the most complete information about your sleep  Learn more about an overnight sleep study  Home sleep apnea test  Some patients with high risk factors for obstructive sleep apnea and no other medical disorders may be candidates for a home sleep apnea test  The testing equipment differs in that it is less complicated than what is used in an overnight sleep study  As such, does not give all the data an in-lab will and if negative, may not mean you do not have the problem    Treatment for sleep apnea includes using a continuous positive airway pressure (CPAP) machine to keep your airway open during sleep  A mask is placed over your nose and mouth, or just your nose  The mask is hooked to the CPAP machine that blows a gentle stream of air into the mask when you breathe  This helps keep your airway open so you can breathe more regularly  Extra oxygen may be given to you through the machine  You may be given a mouth device  It looks like a mouth guard or dental retainer and stops your tongue and mouth tissues from blocking your throat while you sleep  Surgery may be needed to remove extra tissues that block your mouth, throat, or nose  Manage sleep apnea:   Do not smoke  Nicotine and other chemicals in cigarettes and cigars can cause lung damage  Ask your healthcare provider for information if you currently smoke and need help to quit  E-cigarettes or smokeless tobacco still contain nicotine  Talk to your healthcare provider before you use these products  Do not drink alcohol or take sedative medicine before you go to sleep  Alcohol and sedatives can relax the muscles and tissues around your throat  This can block the airflow to your lungs  Maintain a healthy weight  Excess tissue around your throat may restrict your breathing  Ask your healthcare provider for information if you need to lose weight  Sleep on your side or use pillows designed to prevent sleep apnea  This prevents your tongue or other tissues from blocking your throat  You can also raise the head of your bed  Driving Safety  Refrain from driving when drowsy  Follow up with your healthcare provider as directed: Write down your questions so you remember to ask them during your visits  Go to AAS website for more information: Sleepeducation  org      Nursing Support:  When: Monday through Friday 7A-5PM except holidays  Where: Our direct line is [phone]  If you are having a true emergency please call 911    In the event that the line is busy or it is after hours please leave a voice message and we will return your call  Please speak clearly, leaving your full name, birth date, best number to reach you and the reason for your call  Medication refills: We will need the name of the medication, the dosage, the ordering provider, whether you get a 30 or 90 day refill, and the pharmacy name and address  Medications will be ordered by the provider only  Nurses cannot call in prescriptions  Please allow 7 days for medication refills  Physician requested updates: If your provider requested that you call with an update after starting medication, please be ready to provide us the medication and dosage, what time you take your medication, the time you attempt to fall asleep, time you fall asleep, when you wake up, and what time you get out of bed  Sleep Study Results: We will contact you with sleep study results and/or next steps after the physician has reviewed your testing

## 2023-02-28 NOTE — PROGRESS NOTES
[facility] - [facility]  24 y o  male  :1995  KIJ:[id number]  DOS:2023    Physician Requesting Consult: Alexa Miller DO             Reason for Consult : At your kind request I saw Peggy Donovan for initial sleep evaluation today  Home sleep testing was undertaken to evaluate for sleep disordered breathing and patient is here to review results and further options  The study demonstrated: respiratory event index (BRYANT) of 51 0  The lowest SpO2 recorded is 74% and 121 5 minutes during the study was spent with saturations less than 90%  The snore index was 35%     PFSH, Problem List, Medications & Allergies were reviewed in EMR  Carmine Leach  has a past medical history of ADHD (attention deficit hyperactivity disorder), Dysgraphia, Hyperlipidemia (2022), Obesity, MORRIS (obstructive sleep apnea) (2023), Vitamin D deficiency (2022), and Written expression disorder  He has a current medication list which includes the following prescription(s): multiple vitamins-minerals, NON FORMULARY, and ergocalciferol  HPI: His study was undertaken for complaints of "lethargy and weight issues "  He is also been told that he has always snored  It is loud and disturbs others with also witnessed apnea  This has escalated in association with weight gain  He is not aware of snoring or breathing difficulties during sleep  Other Complaints: None  Restless Leg Syndrome: reports no suggestive symptoms  Parasomnia: no features reported    Sleep Routine (on average): Typical Bedtime: Midnight gets OOB: 7 AM TIB:7 hrs  Sleep latency:<  30 minutes Sleep Interruptions:3-4/nite able to fall back asleep  Awakens: needing an alarm  Upon awakening: never feels rested  [He estimates getting 5 hrs sleep ] Carmine Leach reports excessive daytime sleepiness [takes planned naps and feels like napping & is dozing off unintentionally] at work and at home   He rated [himself] at Total score: 14 /24 on the [place] Sleepiness Scale  Habits:   reports that he has never smoked  He has never used smokeless tobacco ;  reports current alcohol use of about 1 0 standard drink per week ;[ reports no history of drug use  ];[  E-Cigarette/Vaping   • E-Cigarette Use Never User    ]; Caffeine use:moderate to assist staying alert until around 4 PM; Exercise routine: regular  Family History: Suspects father had obstructive sleep apnea  ROS: Significant for 60 to 70 pounds weight gain in the space of around 3 months 18 months ago  He reported no nasal or respiratory symptoms  He has episodic tachycardia  EXAM:  /74 (BP Location: Left arm, Patient Position: Sitting, Cuff Size: Large)   Pulse (!) 113   Ht 5' 11" (1 803 m)   Wt 131 kg (288 lb)   BMI 40 17 kg/m²    General: Well groomed male, well appearing, in no apparent distress  Neurological: Alert and orientated; cooperative; Cranial nerves intact;    Psychiatric: Speech: Clear and coherent; normal mood, affect & thought   Skin: Warm and dry; Color& Hydration good; no facial rashes or lesions   HEENT:  Craniofacial anatomy: normal Sinuses: Non-tender  TMJ: Normal   Eyes: EOM's intact; conjunctiva/corneas clear   Ears: Externally appear normal     Nasal Airway: assymetric nares Septum: Intact; Mucosa: Normal; Turbinates: Normal; Rhinorrhea: None  Mouth: Lips: Normal posture; Dentition: normal   Mucosa: Moist; Hard Palate:normal    Oropharryx: crowded, AP narrowing  and laterally narrowedTongue: Mallampati:Class IV, Mobile, Macroglossia and ScallopedSoft Palate:  redundant  Tonsils: 3+   Neck:[is thick and excess fatty tissue;] [Neck Circumference: 18 5 ";] Supple; no abnormal masses; Thyroid: Normal  Trachea: Central     Lymph: No cervical or submandibular Lymhadenopathy  Heart: S1,S2 normal; RRR; tachycardia; no gallop; no murmur s  Lungs: Respiratory Effort: Normal  Air entry good bilaterally  No wheezes    No rales  Abdomen: Obese, soft & non-tender Extremities: No pedal edema  No clubbing or cyanosis  Musculoskeletal:  Motor normal; Gait: Normal        Last CMP in November 2022 demonstrated normal CO2, elevated AST and ALT but otherwise normal   CBC demonstrated elevated red blood cell count but high normal hemoglobin and hematocrit    IMPRESSION: Primary/Secondary Sleep Diagnoses (to Medical or Psych conditions) & Comorbidities   1  MORRIS (obstructive sleep apnea)  CPAP Study      2  Nocturnal hypoxia  CPAP Study      3  Sleep disturbance        4  Excessive daytime sleepiness        5  Tachycardia        6  Tonsillar hypertrophy        7  Morbid obesity ([address] )        8  Attention deficit hyperactivity disorder (ADHD), unspecified ADHD type             PLAN:  1  I reviewed results of the Sleep studies with the patient  2  With respect to above conditions, I counseled on pathophysiology, diagnosis, treatment options, risks and benefits; inter-relationship and effects on symptoms and comorbidities; risks of no treatment; costs and insurance aspects  3  Patient elected positive airway pressure therapy and care coordinated with the DME provider for set-up  4  Need for compliance with therapy and weight reduction were emphasized  5  Follow-up to be scheduled in 2 months to monitor compliance, progress and to adjust therapy  Thank you for allowing me to participate in the care of this patient  I will keep you apprised of developments  Sincerely,      Authenticated electronically on 93/02/15   Board Certified Specialist     Portions of the record may have been created with voice recognition software  Occasional wrong word or "sound a like" substitutions may have occurred due to the inherent limitations of voice recognition software  There may also be notations and random deletions of words or characters from malfunctioning software  Read the chart carefully and recognize, using context, where substitutions/deletions have occurred

## 2023-03-13 ENCOUNTER — HOSPITAL ENCOUNTER (OUTPATIENT)
Dept: SLEEP CENTER | Facility: CLINIC | Age: 28
Discharge: HOME | End: 2023-03-13

## 2023-03-13 DIAGNOSIS — G47.33 OSA (OBSTRUCTIVE SLEEP APNEA): ICD-10-CM

## 2023-03-13 DIAGNOSIS — G47.34 NOCTURNAL HYPOXIA: ICD-10-CM

## 2023-03-14 DIAGNOSIS — G47.33 OSA (OBSTRUCTIVE SLEEP APNEA): Primary | ICD-10-CM

## 2023-03-14 NOTE — PROGRESS NOTES
Sleep Study Documentation    Pre-Sleep Study       Sleep testing procedure explained to patient:YES    Patient napped prior to study:NO    [address] [place] worker after 12PM   Caffeine use:NO    Alcohol:Dayshift workers after 5PM: Alcohol use:NO    Typical day for patient:YES       Study Documentation    Sleep Study Indications:  MORRIS-diagnosed home study at [address] [place] [address]  Sleep Study: Treatment   Optimal PAP pressure:  12 cm H2O  Leak:None  Snore:Eliminated  REM Obtained:yes  Supplemental O2: no    Minimum SaO2  88 0 %  Baseline SaO2  93 4 %  PAP mask tried (list all) Large ResMed AirFit N20 Nasal Mask with no added humidity  PAP mask choice (final) Same  PAP mask type:nasal  PAP pressure at which snoring was eliminated  9 cm H2O  Minimum SaO2 at final PAP pressure  88 0 %  Mode of Therapy:CPAP  ETCO2:No  CPAP changed to BiPAP:No    Mode of Therapy:CPAP    EKG abnormalities: no     EEG abnormalities: yes:  ECG artifact throughout study  Averaged Ms  Sleep Study Recorded < 2 hours: N/A    Sleep Study Recorded > 2 hours but incomplete study: N/A    Sleep Study Recorded 6 hours but no sleep obtained: NO          Post-Sleep Study    Medication used at bedtime or during sleep study:NO    Patient reports time it took to fall asleep:30 to 60 minutes    Patient reports waking up during study:1 to 2 times  Patient reports returning to sleep without difficulty  Patient reports sleeping 4 to 6 hours without dreaming  Patient reports sleep during study:better than usual    Patient rated sleepiness: Somewhat sleepy or tired    PAP treatment:yes: Post PAP treatment patient reports feeling better and  would wear PAP mask at home

## 2023-03-21 ENCOUNTER — TELEPHONE (OUTPATIENT)
Dept: SLEEP CENTER | Facility: CLINIC | Age: 28
End: 2023-03-21

## 2023-03-21 NOTE — TELEPHONE ENCOUNTER
Call placed to patient  Advised CPAP study is resulted  APAP ordered  DME setup 4/4/2023 - [place]  Compliance follow-up with Dr Jamar Layne 6/16/2023 in the [place] office  Rx for CPAP and clinicals sent to [facility] via [place]

## 2023-03-22 LAB

## 2023-03-31 LAB

## 2023-04-04 LAB

## 2023-06-16 ENCOUNTER — OFFICE VISIT (OUTPATIENT)
Dept: SLEEP CENTER | Facility: CLINIC | Age: 28
End: 2023-06-16
Payer: COMMERCIAL

## 2023-06-16 VITALS
BODY MASS INDEX: 40.6 KG/M2 | SYSTOLIC BLOOD PRESSURE: 124 MMHG | HEIGHT: 71 IN | DIASTOLIC BLOOD PRESSURE: 84 MMHG | WEIGHT: 290 LBS

## 2023-06-16 DIAGNOSIS — G47.33 OSA (OBSTRUCTIVE SLEEP APNEA): Primary | ICD-10-CM

## 2023-06-16 DIAGNOSIS — J35.1 TONSILLAR HYPERTROPHY: ICD-10-CM

## 2023-06-16 DIAGNOSIS — G47.9 SLEEP DISTURBANCE: ICD-10-CM

## 2023-06-16 DIAGNOSIS — G47.19 EXCESSIVE DAYTIME SLEEPINESS: ICD-10-CM

## 2023-06-16 DIAGNOSIS — R00.0 TACHYCARDIA: ICD-10-CM

## 2023-06-16 DIAGNOSIS — E66.01 MORBID OBESITY (HCC): ICD-10-CM

## 2023-06-16 DIAGNOSIS — G47.34 NOCTURNAL HYPOXIA: ICD-10-CM

## 2023-06-16 PROCEDURE — 99214 OFFICE O/P EST MOD 30 MIN: CPT | Performed by: INTERNAL MEDICINE

## 2023-06-16 NOTE — PATIENT INSTRUCTIONS

## 2023-06-16 NOTE — PROGRESS NOTES
Follow-Up Note - Sleep Center   Nell Mcghee  24 y o  male  :1995  ZDN:[id number]  DOS:2023    CC: I saw this patient for follow-up in clinic today for Sleep disordered breathing, Coexisting Sleep and Medical Problems  Interval changes: Treatment was initiated using a ResMed machine  A sleep study to titrate Positive airway pressure (PAP) therapy was undertaken  Patient is here to review results and to initiate therapy  The study demonstrated sleep disordered breathing appeared to be adequately remediated with positive airway pressure at 12 cm H2O  There was treatment emergent central apnea    The preceding diagnostic study demonstrated: respiratory event index (BRYANT) of 51 0  The lowest SpO2 recorded is 74% and 121 5 minutes during the study was spent with saturations less than 90%  The snore index was 35%      PFSH, Problem List, Medications & Allergies were reviewed in EMR  He  has a past medical history of ADHD (attention deficit hyperactivity disorder), Dysgraphia, Hyperlipidemia (2022), Obesity, MORRIS (obstructive sleep apnea) (2023), Vitamin D deficiency (2022), and Written expression disorder  He has a current medication list which includes the following prescription(s): multiple vitamins-minerals, ergocalciferol, and NON FORMULARY  PHYSIOLOGICAL DATA REVIEW : Using PAP > 4 hours/night 97%  Estimated BRYANT 1/hour with pressure of 12cm H2O; patient has not been using non FDA approved devices to sanitize the machine  INTERPRETATION: Compliance is excellent; Pressure setting is:optimal; ;   SUBJECTIVE: With respect to use of PAP, William Barrow  is experiencing minimal adverse effects:mask leaks   He derives benefit  Is satisfied with sleep and daytime function  Sleep Routine: William Barrow reports getting 7 hrs sleep; he has no difficulty initiating or maintaining sleep   He arises needing an alarm and feels more refreshed since on Rx  William Barrow reports significantly improved excessive "daytime sleepiness,  He rated [himself] at Total score: 9 /24 on the [place] Sleepiness Scale  Other issues: Reports some snoring that he notes on awakening the mornings and attributes to mask leaks  Habits:[ reports that he has never smoked  He has never used smokeless tobacco ], [ reports current alcohol use of about 1 0 standard drink of alcohol per week ], [ reports no history of drug use ], Caffeine use:none; Exercise routine: regular  ROS: Significant for weight has been stable  A 10 point review of systems was otherwise negative  [facility] EXAM: /84 (BP Location: Left arm, Patient Position: Sitting, Cuff Size: Large)   Ht 5' 11\" (1 803 m)   Wt 132 kg (290 lb)   BMI 40 45 kg/m²     Wt Readings from Last 3 Encounters:   06/16/23 132 kg (290 lb)   02/28/23 131 kg (288 lb)   11/30/22 127 kg (281 lb)      Patient is well groomed; well appearing  CNS: Alert, orientated, clear & coherent speech  Psych: cooperative and in no distress  Mental state: Appears normal   H&N: EOMI; NC/AT: No facial pressure marks, no rashes  Skin/Extrem: col & hydration normal; no edema  Resp: Respiratory effort is normal  Physical findings otherwise essentially unchanged from previous  IMPRESSION: Problem List Items & Comorbidities Addressed this Visit    1  MORRIS (obstructive sleep apnea)  PAP DME Resupply/Reorder      2  Nocturnal hypoxia        3  Sleep disturbance        4  Excessive daytime sleepiness        5  Tachycardia        6  Tonsillar hypertrophy        7  Morbid obesity ([address] )        1-5 improved    PLAN:  1  I reviewed results of prior studies and physiologic data with the patient  2  I discussed treatment options with risks and benefits  3  Treatment with  PAP is medically necessary and Fahad Hancock is agreable to continue use  4  Care of equipment, methods to improve comfort using PAP and importance of compliance with therapy were discussed    5  Pressure setting: continue 12 cmH2O     6  Rx provided to " "replace supplies and Care coordinated with DME provider for refitting of the interface  7  Discussed strategies for weight reduction  8  Follow-up is advised in 1 year or sooner if needed to monitor progress, compliance and to adjust therapy  Thank you for allowing me to participate in the care of this patient  Sincerely,     Authenticated electronically on 66/98/86   Board Certified Specialist     Portions of the record may have been created with voice recognition software  Occasional wrong word or \"sound a like\" substitutions may have occurred due to the inherent limitations of voice recognition software  There may also be notations and random deletions of words or characters from malfunctioning software  Read the chart carefully and recognize, using context, where substitutions/deletions have occurred      "

## 2023-06-19 ENCOUNTER — TELEPHONE (OUTPATIENT)
Dept: SLEEP CENTER | Facility: CLINIC | Age: 28
End: 2023-06-19

## 2023-06-20 LAB
DME PARACHUTE DELIVERY DATE ACTUAL: NORMAL
DME PARACHUTE DELIVERY DATE ACTUAL: NORMAL
DME PARACHUTE DELIVERY DATE REQUESTED: NORMAL
DME PARACHUTE DELIVERY DATE REQUESTED: NORMAL
DME PARACHUTE ITEM DESCRIPTION: NORMAL
DME PARACHUTE ORDER STATUS: NORMAL
DME PARACHUTE ORDER STATUS: NORMAL
DME PARACHUTE SUPPLIER NAME: NORMAL
DME PARACHUTE SUPPLIER NAME: NORMAL
DME PARACHUTE SUPPLIER PHONE: NORMAL
DME PARACHUTE SUPPLIER PHONE: NORMAL

## 2023-12-01 ENCOUNTER — TELEPHONE (OUTPATIENT)
Age: 28
End: 2023-12-01

## 2024-04-01 ENCOUNTER — TELEPHONE (OUTPATIENT)
Dept: FAMILY MEDICINE CLINIC | Facility: CLINIC | Age: 29
End: 2024-04-01

## 2024-04-01 NOTE — TELEPHONE ENCOUNTER
Called and spoke with patient. Patient states they have moved and are no longer using Dr. King as their pcp.